# Patient Record
Sex: MALE | Race: WHITE | NOT HISPANIC OR LATINO | URBAN - METROPOLITAN AREA
[De-identification: names, ages, dates, MRNs, and addresses within clinical notes are randomized per-mention and may not be internally consistent; named-entity substitution may affect disease eponyms.]

---

## 2017-02-06 ENCOUNTER — ALLSCRIPTS OFFICE VISIT (OUTPATIENT)
Dept: OTHER | Facility: OTHER | Age: 32
End: 2017-02-06

## 2018-01-14 VITALS
SYSTOLIC BLOOD PRESSURE: 118 MMHG | BODY MASS INDEX: 21.13 KG/M2 | TEMPERATURE: 98.3 F | HEIGHT: 66 IN | HEART RATE: 96 BPM | WEIGHT: 131.5 LBS | DIASTOLIC BLOOD PRESSURE: 80 MMHG | OXYGEN SATURATION: 97 %

## 2018-09-13 ENCOUNTER — OFFICE VISIT (OUTPATIENT)
Dept: FAMILY MEDICINE CLINIC | Facility: CLINIC | Age: 33
End: 2018-09-13
Payer: COMMERCIAL

## 2018-09-13 VITALS
OXYGEN SATURATION: 97 % | WEIGHT: 131 LBS | BODY MASS INDEX: 21.05 KG/M2 | DIASTOLIC BLOOD PRESSURE: 82 MMHG | HEART RATE: 78 BPM | TEMPERATURE: 99 F | SYSTOLIC BLOOD PRESSURE: 130 MMHG | HEIGHT: 66 IN

## 2018-09-13 DIAGNOSIS — Z13.220 SCREENING FOR LIPID DISORDERS: ICD-10-CM

## 2018-09-13 DIAGNOSIS — Z00.00 HEALTHCARE MAINTENANCE: Primary | ICD-10-CM

## 2018-09-13 PROCEDURE — 99395 PREV VISIT EST AGE 18-39: CPT | Performed by: NURSE PRACTITIONER

## 2018-09-13 NOTE — PROGRESS NOTES
Assessment/Plan:    No problem-specific Assessment & Plan notes found for this encounter  Diagnoses and all orders for this visit:    Healthcare maintenance  Comments:  will update labs patient is appearing healthy and well     Screening for lipid disorders  -     Comprehensive metabolic panel; Future  -     Lipid panel; Future    Other orders  -     Zinc-Magnesium Aspart-Vit B6 (ZINC MAGNESIUM ASPARTATE PO); Take by mouth  -     Melatonin 2 5 MG CHEW; Chew 2 5 mg          Subjective:      Patient ID: Alfredo Doherty is a 29 y o  male  Patient here today for his check up has been a while since his last office appointment  Patient reports that he has been having some increase anxiety lately and increase urination in the day time no burning with with urination and no new sexual partners patient is working a 10 hour shift and off one day a week  PHQ-9 score 4 DEV-7 score 4  Patient having a few times with feeling a tightness in his chest on both sides of his body happening only on occasion about twice a week noticed when he is driving to or from work seems to be muscular in nature  Patient had a recent history of having physical therapy to help his lower back and improve his posture  Patient also has been doing pull ups again more and had not been doing this exercise in the past          The following portions of the patient's history were reviewed and updated as appropriate:   He  has a past medical history of Anxiety; Attention deficit disorder (ADD) without hyperactivity; Narcolepsy; and Sleep apnea  He   Patient Active Problem List    Diagnosis Date Noted    Screening for lipid disorders 09/13/2018     He  has no past surgical history on file  His family history includes Hypertension in his father  He  reports that he has never smoked  He has never used smokeless tobacco  His alcohol and drug histories are not on file  He has No Known Allergies       Review of Systems   Constitutional: Negative for activity change, appetite change, chills, diaphoresis, fatigue, fever and unexpected weight change  HENT: Negative for congestion, ear pain, hearing loss, postnasal drip, sinus pain, sinus pressure, sneezing and sore throat  Eyes: Negative for pain, redness and visual disturbance  Respiratory: Negative for cough and shortness of breath  Cardiovascular: Negative for chest pain and leg swelling  Gastrointestinal: Negative for abdominal pain, diarrhea, nausea and vomiting  Endocrine: Negative  Genitourinary: Negative  Musculoskeletal: Positive for myalgias  Negative for arthralgias  Skin: Negative  Allergic/Immunologic: Negative  Neurological: Negative for dizziness and light-headedness  Psychiatric/Behavioral: Negative for behavioral problems and dysphoric mood  Objective:      /82   Pulse 78   Temp 99 °F (37 2 °C)   Ht 5' 6" (1 676 m)   Wt 59 4 kg (131 lb)   SpO2 97%   BMI 21 14 kg/m²          Physical Exam   Constitutional: He is oriented to person, place, and time  Vital signs are normal  He appears well-developed and well-nourished  No distress  HENT:   Head: Normocephalic and atraumatic  Eyes: Pupils are equal, round, and reactive to light  Neck: Normal range of motion  No thyromegaly present  Cardiovascular: Normal rate, regular rhythm, normal heart sounds and intact distal pulses  No murmur heard  Pulmonary/Chest: Effort normal and breath sounds normal  No respiratory distress  He has no wheezes  Abdominal: Soft  Bowel sounds are normal    Musculoskeletal: Normal range of motion  Neurological: He is alert and oriented to person, place, and time  Skin: Skin is warm and dry  Psychiatric: He has a normal mood and affect  Nursing note and vitals reviewed

## 2018-09-21 ENCOUNTER — APPOINTMENT (OUTPATIENT)
Dept: LAB | Facility: CLINIC | Age: 33
End: 2018-09-21
Payer: COMMERCIAL

## 2018-09-21 DIAGNOSIS — Z13.220 SCREENING FOR LIPID DISORDERS: ICD-10-CM

## 2018-09-21 LAB
ALBUMIN SERPL BCP-MCNC: 4.3 G/DL (ref 3.5–5)
ALP SERPL-CCNC: 75 U/L (ref 46–116)
ALT SERPL W P-5'-P-CCNC: 28 U/L (ref 12–78)
ANION GAP SERPL CALCULATED.3IONS-SCNC: 5 MMOL/L (ref 4–13)
AST SERPL W P-5'-P-CCNC: 18 U/L (ref 5–45)
BILIRUB SERPL-MCNC: 0.54 MG/DL (ref 0.2–1)
BUN SERPL-MCNC: 16 MG/DL (ref 5–25)
CALCIUM SERPL-MCNC: 9.4 MG/DL (ref 8.3–10.1)
CHLORIDE SERPL-SCNC: 104 MMOL/L (ref 100–108)
CHOLEST SERPL-MCNC: 146 MG/DL (ref 50–200)
CO2 SERPL-SCNC: 29 MMOL/L (ref 21–32)
CREAT SERPL-MCNC: 1.05 MG/DL (ref 0.6–1.3)
GFR SERPL CREATININE-BSD FRML MDRD: 95 ML/MIN/1.73SQ M
GLUCOSE P FAST SERPL-MCNC: 87 MG/DL (ref 65–99)
HDLC SERPL-MCNC: 52 MG/DL (ref 40–60)
LDLC SERPL CALC-MCNC: 82 MG/DL (ref 0–100)
NONHDLC SERPL-MCNC: 94 MG/DL
POTASSIUM SERPL-SCNC: 4 MMOL/L (ref 3.5–5.3)
PROT SERPL-MCNC: 7.8 G/DL (ref 6.4–8.2)
SODIUM SERPL-SCNC: 138 MMOL/L (ref 136–145)
TRIGL SERPL-MCNC: 62 MG/DL

## 2018-09-21 PROCEDURE — 80053 COMPREHEN METABOLIC PANEL: CPT

## 2018-09-21 PROCEDURE — 36415 COLL VENOUS BLD VENIPUNCTURE: CPT

## 2018-09-21 PROCEDURE — 80061 LIPID PANEL: CPT

## 2019-11-23 ENCOUNTER — TELEPHONE (OUTPATIENT)
Dept: FAMILY MEDICINE CLINIC | Facility: CLINIC | Age: 34
End: 2019-11-23

## 2021-07-30 ENCOUNTER — OFFICE VISIT (OUTPATIENT)
Dept: FAMILY MEDICINE CLINIC | Facility: CLINIC | Age: 36
End: 2021-07-30
Payer: COMMERCIAL

## 2021-07-30 VITALS
HEART RATE: 94 BPM | OXYGEN SATURATION: 95 % | WEIGHT: 141 LBS | DIASTOLIC BLOOD PRESSURE: 80 MMHG | HEIGHT: 66 IN | BODY MASS INDEX: 22.66 KG/M2 | SYSTOLIC BLOOD PRESSURE: 128 MMHG | TEMPERATURE: 96.8 F

## 2021-07-30 DIAGNOSIS — L30.4 INTERTRIGO: Primary | ICD-10-CM

## 2021-07-30 PROCEDURE — 99214 OFFICE O/P EST MOD 30 MIN: CPT | Performed by: PHYSICIAN ASSISTANT

## 2021-07-30 PROCEDURE — 3008F BODY MASS INDEX DOCD: CPT | Performed by: PHYSICIAN ASSISTANT

## 2021-07-30 PROCEDURE — 3725F SCREEN DEPRESSION PERFORMED: CPT | Performed by: PHYSICIAN ASSISTANT

## 2021-07-30 RX ORDER — CLOTRIMAZOLE 1 %
CREAM (GRAM) TOPICAL 2 TIMES DAILY
Qty: 30 G | Refills: 0 | Status: SHIPPED | OUTPATIENT
Start: 2021-07-30

## 2021-07-30 NOTE — PROGRESS NOTES
Assessment/Plan:       Problem List Items Addressed This Visit     None      Visit Diagnoses     Intertrigo    -  Primary    Relevant Medications    clotrimazole (LOTRIMIN) 1 % cream        rash consistent with fungal etiology, recommend lotrimin BID, keep axillae cool, dry and clean  Follow up if not improving  Neck neck and HENT exam normal      Subjective:      Patient ID: Toro Uribe is a 37 y o  male  Pt presents with a rash in his armpits over the past 3 months  Comes and goes  Occasionally itchy  Red  No other rashes  No significant pain  He works in [employer] often and shares he has been bit by several insects  He also shares when he was riding his horse recently the horse puts its head back and stuck his nose and caused his neck to extend  He shares he has a mild dull discomfort to the anterior neck in the submandibular region  Improved with ibuprofen  No bruising  No jaw pain  Seems to be improving per pt     Rash  This is a new problem  The current episode started more than 1 month ago  The problem has been waxing and waning since onset  The affected locations include the left axilla and right axilla  The rash is characterized by redness  He was exposed to an insect bite/sting and a spider bite  Pertinent negatives include no anorexia, congestion, cough, diarrhea, eye pain, facial edema, fatigue, fever, joint pain, nail changes, rhinorrhea, shortness of breath, sore throat or vomiting  The following portions of the patient's history were reviewed and updated as appropriate:   He  has a past medical history of Anxiety, Attention deficit disorder (ADD) without hyperactivity, Narcolepsy, and Sleep apnea  He   Patient Active Problem List    Diagnosis Date Noted    Screening for lipid disorders 09/13/2018     He  has no past surgical history on file  His family history includes Hypertension in his father  He  reports that he has never smoked   He has never used smokeless tobacco  No history on file for alcohol use and drug use  Current Outpatient Medications   Medication Sig Dispense Refill    Zinc-Magnesium Aspart-Vit B6 (ZINC MAGNESIUM ASPARTATE PO) Take by mouth      clotrimazole (LOTRIMIN) 1 % cream Apply topically 2 (two) times a day 30 g 0    Melatonin 2 5 MG CHEW Chew 2 5 mg (Patient not taking: Reported on 7/30/2021)       No current facility-administered medications for this visit  Current Outpatient Medications on File Prior to Visit   Medication Sig    Zinc-Magnesium Aspart-Vit B6 (ZINC MAGNESIUM ASPARTATE PO) Take by mouth    Melatonin 2 5 MG CHEW Chew 2 5 mg (Patient not taking: Reported on 7/30/2021)     No current facility-administered medications on file prior to visit  He has No Known Allergies       Review of Systems   Constitutional: Negative for fatigue and fever  HENT: Negative for congestion, rhinorrhea and sore throat  Eyes: Negative for pain  Respiratory: Negative for cough and shortness of breath  Gastrointestinal: Negative for anorexia, diarrhea and vomiting  Musculoskeletal: Negative for joint pain  Skin: Positive for color change and rash  Negative for nail changes  Objective:      /80   Pulse 94   Temp (!) 96 8 °F (36 °C)   Ht 5' 6" (1 676 m)   Wt 64 kg (141 lb)   SpO2 95%   BMI 22 76 kg/m²          Physical Exam  Vitals and nursing note reviewed  Constitutional:       General: He is not in acute distress  Appearance: Normal appearance  HENT:      Head: Normocephalic and atraumatic  Right Ear: Tympanic membrane, ear canal and external ear normal       Left Ear: Tympanic membrane, ear canal and external ear normal       Nose: Nose normal       Mouth/Throat:      Mouth: Mucous membranes are moist       Pharynx: Oropharynx is clear  No oropharyngeal exudate or posterior oropharyngeal erythema  Eyes:      Pupils: Pupils are equal, round, and reactive to light     Neck:      Thyroid: No thyromegaly or thyroid tenderness  Trachea: Trachea and phonation normal  No tracheal tenderness or tracheal deviation  Cardiovascular:      Rate and Rhythm: Normal rate and regular rhythm  Pulses: Normal pulses  Heart sounds: Normal heart sounds  No murmur heard  Pulmonary:      Effort: Pulmonary effort is normal  No respiratory distress  Breath sounds: Normal breath sounds  No wheezing, rhonchi or rales  Musculoskeletal:         General: Normal range of motion  Cervical back: Normal range of motion and neck supple  No tenderness  No spinous process tenderness or muscular tenderness  Normal range of motion  Lymphadenopathy:      Cervical: No cervical adenopathy  Right cervical: No superficial, deep or posterior cervical adenopathy  Left cervical: No superficial, deep or posterior cervical adenopathy  Skin:     General: Skin is warm and dry  Findings: Erythema and rash present  Comments: Fungal rash of bilateral axilla   Neurological:      Mental Status: He is alert and oriented to person, place, and time     Psychiatric:         Mood and Affect: Mood and affect normal

## 2021-09-30 ENCOUNTER — OFFICE VISIT (OUTPATIENT)
Dept: FAMILY MEDICINE CLINIC | Facility: CLINIC | Age: 36
End: 2021-09-30
Payer: COMMERCIAL

## 2021-09-30 VITALS
TEMPERATURE: 98.7 F | HEART RATE: 80 BPM | BODY MASS INDEX: 23.3 KG/M2 | WEIGHT: 145 LBS | HEIGHT: 66 IN | DIASTOLIC BLOOD PRESSURE: 80 MMHG | OXYGEN SATURATION: 98 % | SYSTOLIC BLOOD PRESSURE: 130 MMHG

## 2021-09-30 DIAGNOSIS — L30.4 INTERTRIGO: ICD-10-CM

## 2021-09-30 DIAGNOSIS — Z13.220 SCREENING FOR LIPID DISORDERS: ICD-10-CM

## 2021-09-30 DIAGNOSIS — Z13.1 SCREENING FOR DIABETES MELLITUS: ICD-10-CM

## 2021-09-30 DIAGNOSIS — Z00.00 HEALTH MAINTENANCE EXAMINATION: Primary | ICD-10-CM

## 2021-09-30 PROCEDURE — 99395 PREV VISIT EST AGE 18-39: CPT | Performed by: PHYSICIAN ASSISTANT

## 2021-09-30 RX ORDER — CLOTRIMAZOLE AND BETAMETHASONE DIPROPIONATE 10; .64 MG/G; MG/G
CREAM TOPICAL 2 TIMES DAILY
Qty: 30 G | Refills: 0 | Status: SHIPPED | OUTPATIENT
Start: 2021-09-30

## 2021-09-30 NOTE — PROGRESS NOTES
Assessment/Plan:       Problem List Items Addressed This Visit        Other    Screening for lipid disorders    Relevant Orders    Lipid Panel with Direct LDL reflex      Other Visit Diagnoses     Health maintenance examination    -  Primary    Screening for diabetes mellitus        Relevant Orders    Comprehensive metabolic panel    Intertrigo        Relevant Medications    clotrimazole-betamethasone (LOTRISONE) 1-0 05 % cream        hx reviewed and updated  Appearance of rash in axilla improving but not resolving  Switch to lotrisone  Keep cool and dry  Update screening labs  Exam otherwise normal  Annual follow ups, earlier as needed    Subjective:      Patient ID: Baldomero Spann is a 37 y o  male  Pt presents for annual physical  He was seen 2 months ago for red rash in bilateral axilla and given lotrimin, the appearance improved but has not resolved  Otherwise he has no acute complaints  He denies interval health changes  No daily medications besides vitamin  No changes in FH  No smoker  No alcohol use  No drug use  Rash  This is a chronic problem  The current episode started more than 1 month ago  The problem is unchanged  The affected locations include the left axilla and right axilla  The rash is characterized by redness  He was exposed to nothing  Pertinent negatives include no anorexia, congestion, cough, diarrhea, eye pain, facial edema, fatigue, fever, joint pain, nail changes, rhinorrhea, shortness of breath, sore throat or vomiting  The following portions of the patient's history were reviewed and updated as appropriate:   He  has a past medical history of Anxiety, Attention deficit disorder (ADD) without hyperactivity, Narcolepsy, and Sleep apnea  He   Patient Active Problem List    Diagnosis Date Noted    Screening for lipid disorders 09/13/2018     He  has no past surgical history on file  His family history includes Hypertension in his father    He  reports that he has never smoked  He has never used smokeless tobacco  No history on file for alcohol use and drug use  Current Outpatient Medications   Medication Sig Dispense Refill    clotrimazole (LOTRIMIN) 1 % cream Apply topically 2 (two) times a day 30 g 0    clotrimazole-betamethasone (LOTRISONE) 1-0 05 % cream Apply topically 2 (two) times a day 30 g 0    Melatonin 2 5 MG CHEW Chew 2 5 mg (Patient not taking: Reported on 7/30/2021)      Zinc-Magnesium Aspart-Vit B6 (ZINC MAGNESIUM ASPARTATE PO) Take by mouth       No current facility-administered medications for this visit  Current Outpatient Medications on File Prior to Visit   Medication Sig    clotrimazole (LOTRIMIN) 1 % cream Apply topically 2 (two) times a day    Melatonin 2 5 MG CHEW Chew 2 5 mg (Patient not taking: Reported on 7/30/2021)    Zinc-Magnesium Aspart-Vit B6 (ZINC MAGNESIUM ASPARTATE PO) Take by mouth     No current facility-administered medications on file prior to visit  He has No Known Allergies       Review of Systems   Constitutional: Negative for fatigue and fever  HENT: Negative for congestion, rhinorrhea and sore throat  Eyes: Negative for pain  Respiratory: Negative for cough and shortness of breath  Gastrointestinal: Negative for anorexia, diarrhea and vomiting  Musculoskeletal: Negative for joint pain  Skin: Positive for rash  Negative for nail changes  Objective:      /80   Pulse 80   Temp 98 7 °F (37 1 °C)   Ht 5' 6" (1 676 m)   Wt 65 8 kg (145 lb)   SpO2 98%   BMI 23 40 kg/m²          Physical Exam  Vitals and nursing note reviewed  Constitutional:       General: He is not in acute distress  Appearance: Normal appearance  HENT:      Head: Normocephalic and atraumatic        Right Ear: Tympanic membrane, ear canal and external ear normal       Left Ear: Tympanic membrane, ear canal and external ear normal       Nose: Nose normal       Mouth/Throat:      Mouth: Mucous membranes are moist  Pharynx: Oropharynx is clear  No oropharyngeal exudate or posterior oropharyngeal erythema  Eyes:      Pupils: Pupils are equal, round, and reactive to light  Cardiovascular:      Rate and Rhythm: Normal rate and regular rhythm  Pulses: Normal pulses  Heart sounds: Normal heart sounds  No murmur heard  Pulmonary:      Effort: Pulmonary effort is normal  No respiratory distress  Breath sounds: Normal breath sounds  Abdominal:      General: Bowel sounds are normal  There is no distension  Palpations: Abdomen is soft  Tenderness: There is no abdominal tenderness  There is no guarding  Musculoskeletal:         General: Normal range of motion  Cervical back: Normal range of motion and neck supple  Right lower leg: No edema  Left lower leg: No edema  Lymphadenopathy:      Cervical: No cervical adenopathy  Skin:     General: Skin is warm and dry  Neurological:      Mental Status: He is alert and oriented to person, place, and time     Psychiatric:         Mood and Affect: Mood and affect normal

## 2021-10-01 ENCOUNTER — APPOINTMENT (OUTPATIENT)
Dept: LAB | Facility: CLINIC | Age: 36
End: 2021-10-01
Payer: COMMERCIAL

## 2021-10-01 DIAGNOSIS — Z13.1 SCREENING FOR DIABETES MELLITUS: ICD-10-CM

## 2021-10-01 DIAGNOSIS — Z13.220 SCREENING FOR LIPID DISORDERS: ICD-10-CM

## 2021-10-01 LAB
ALBUMIN SERPL BCP-MCNC: 4.1 G/DL (ref 3.5–5)
ALP SERPL-CCNC: 76 U/L (ref 46–116)
ALT SERPL W P-5'-P-CCNC: 67 U/L (ref 12–78)
ANION GAP SERPL CALCULATED.3IONS-SCNC: 3 MMOL/L (ref 4–13)
AST SERPL W P-5'-P-CCNC: 25 U/L (ref 5–45)
BILIRUB SERPL-MCNC: 0.45 MG/DL (ref 0.2–1)
BUN SERPL-MCNC: 16 MG/DL (ref 5–25)
CALCIUM SERPL-MCNC: 9.1 MG/DL (ref 8.3–10.1)
CHLORIDE SERPL-SCNC: 106 MMOL/L (ref 100–108)
CHOLEST SERPL-MCNC: 171 MG/DL (ref 50–200)
CO2 SERPL-SCNC: 30 MMOL/L (ref 21–32)
CREAT SERPL-MCNC: 0.93 MG/DL (ref 0.6–1.3)
GFR SERPL CREATININE-BSD FRML MDRD: 107 ML/MIN/1.73SQ M
GLUCOSE P FAST SERPL-MCNC: 90 MG/DL (ref 65–99)
HDLC SERPL-MCNC: 51 MG/DL
LDLC SERPL CALC-MCNC: 111 MG/DL (ref 0–100)
POTASSIUM SERPL-SCNC: 4 MMOL/L (ref 3.5–5.3)
PROT SERPL-MCNC: 7.6 G/DL (ref 6.4–8.2)
SODIUM SERPL-SCNC: 139 MMOL/L (ref 136–145)
TRIGL SERPL-MCNC: 43 MG/DL

## 2021-10-01 PROCEDURE — 80061 LIPID PANEL: CPT

## 2021-10-01 PROCEDURE — 36415 COLL VENOUS BLD VENIPUNCTURE: CPT

## 2021-10-01 PROCEDURE — 80053 COMPREHEN METABOLIC PANEL: CPT

## 2022-08-19 ENCOUNTER — OFFICE VISIT (OUTPATIENT)
Dept: FAMILY MEDICINE CLINIC | Facility: CLINIC | Age: 37
End: 2022-08-19
Payer: COMMERCIAL

## 2022-08-19 VITALS
HEART RATE: 68 BPM | OXYGEN SATURATION: 96 % | DIASTOLIC BLOOD PRESSURE: 64 MMHG | HEIGHT: 66 IN | BODY MASS INDEX: 22.34 KG/M2 | WEIGHT: 139 LBS | SYSTOLIC BLOOD PRESSURE: 120 MMHG

## 2022-08-19 DIAGNOSIS — H60.01 ABSCESS OF RIGHT EXTERNAL EAR: Primary | ICD-10-CM

## 2022-08-19 DIAGNOSIS — F60.1 SCHIZOID PERSONALITY (HCC): ICD-10-CM

## 2022-08-19 PROCEDURE — 99214 OFFICE O/P EST MOD 30 MIN: CPT | Performed by: PHYSICIAN ASSISTANT

## 2022-08-19 PROCEDURE — 3725F SCREEN DEPRESSION PERFORMED: CPT | Performed by: PHYSICIAN ASSISTANT

## 2022-08-19 RX ORDER — CEPHALEXIN 500 MG/1
500 CAPSULE ORAL EVERY 6 HOURS SCHEDULED
Qty: 20 CAPSULE | Refills: 0 | Status: SHIPPED | OUTPATIENT
Start: 2022-08-19 | End: 2022-08-24

## 2022-08-19 NOTE — PROGRESS NOTES
Assessment/Plan:       Problem List Items Addressed This Visit    None     Visit Diagnoses     Abscess of right external ear    -  Primary    Relevant Medications    cephalexin (KEFLEX) 500 mg capsule    Other Relevant Orders    Ambulatory Referral to General Surgery    Schizoid personality [facility])            will cover abscess with keflex, due to location and longevity refer to general surgery for intervention  Discussed return precautions  Referral to neuropsychology provided to pt  Subjective:      Patient ID: Lucian Barajas is a 31 y o  male  Pt presents with lump behind R ear, seems to get bigger/smaller  Drains occasionally  Painful  No fevers/chills  Present for 2 months    Also shares when he was 23 he was diagnosed with schizoid personality  He shares he feels his sx may be more related to autism, would like a repeat neuropsych eval now that 14 years has passed  He denies hallucinations, delusions  Mood stable  The following portions of the patient's history were reviewed and updated as appropriate:   He  has a past medical history of Anxiety, Attention deficit disorder (ADD) without hyperactivity, Narcolepsy, and Sleep apnea  He   Patient Active Problem List    Diagnosis Date Noted    Screening for lipid disorders 09/13/2018     He  has no past surgical history on file  His family history includes Hypertension in his father  He  reports that he has never smoked  He has never used smokeless tobacco  No history on file for alcohol use and drug use  Current Outpatient Medications   Medication Sig Dispense Refill    cephalexin (KEFLEX) 500 mg capsule Take 1 capsule (500 mg total) by mouth every 6 (six) hours for 5 days 20 capsule 0    clotrimazole-betamethasone (LOTRISONE) 1-0 05 % cream Apply topically 2 (two) times a day 30 g 0    Zinc-Magnesium Aspart-Vit B6 (ZINC MAGNESIUM ASPARTATE PO) Take by mouth       No current facility-administered medications for this visit       Current Outpatient Medications on File Prior to Visit   Medication Sig    clotrimazole-betamethasone (LOTRISONE) 1-0 05 % cream Apply topically 2 (two) times a day    Zinc-Magnesium Aspart-Vit B6 (ZINC MAGNESIUM ASPARTATE PO) Take by mouth    [DISCONTINUED] clotrimazole (LOTRIMIN) 1 % cream Apply topically 2 (two) times a day    [DISCONTINUED] Melatonin 2 5 MG CHEW Chew 2 5 mg     No current facility-administered medications on file prior to visit  He has No Known Allergies       Review of Systems   Constitutional: Negative for chills, fatigue and fever  HENT: Negative for congestion, ear pain, hearing loss, nosebleeds, postnasal drip, rhinorrhea, sinus pressure, sinus pain, sneezing and sore throat  Eyes: Negative for pain, discharge, itching and visual disturbance  Respiratory: Negative for cough, chest tightness, shortness of breath and wheezing  Cardiovascular: Negative for chest pain, palpitations and leg swelling  Gastrointestinal: Negative for abdominal pain, blood in stool, constipation, diarrhea, nausea and vomiting  Genitourinary: Negative for frequency and urgency  Skin:        abscess   Neurological: Negative for dizziness, light-headedness and numbness  Objective:      /64   Pulse 68   Ht 5' 6" (1 676 m)   Wt 63 kg (139 lb)   SpO2 96%   BMI 22 44 kg/m²          Physical Exam  Vitals and nursing note reviewed  Constitutional:       General: He is not in acute distress  Appearance: Normal appearance  He is not ill-appearing  HENT:      Head: Normocephalic and atraumatic  Right Ear: Tympanic membrane and ear canal normal       Left Ear: Tympanic membrane and ear canal normal       Nose: Nose normal    Pulmonary:      Effort: Pulmonary effort is normal  No respiratory distress  Musculoskeletal:      Cervical back: Normal range of motion and neck supple  No tenderness  Lymphadenopathy:      Cervical: No cervical adenopathy     Skin:     General: Skin is warm and dry  Findings: Abscess present  Neurological:      Mental Status: He is alert and oriented to person, place, and time

## 2022-08-26 DIAGNOSIS — Z13.41 ENCOUNTER FOR SCREENING FOR AUTISM: Primary | ICD-10-CM

## 2022-08-31 ENCOUNTER — TELEPHONE (OUTPATIENT)
Dept: SURGERY | Facility: CLINIC | Age: 37
End: 2022-08-31

## 2022-08-31 NOTE — TELEPHONE ENCOUNTER
REP: Paula Tyson  REF# [id number]  PPO= NO REFERRAL  INS:[facility]  "since you par with your local [facility], this is in network"  Active as of: 7/1/2022

## 2022-09-02 ENCOUNTER — CONSULT (OUTPATIENT)
Dept: SURGERY | Facility: CLINIC | Age: 37
End: 2022-09-02
Payer: COMMERCIAL

## 2022-09-02 VITALS
BODY MASS INDEX: 22.18 KG/M2 | SYSTOLIC BLOOD PRESSURE: 118 MMHG | OXYGEN SATURATION: 98 % | HEIGHT: 66 IN | TEMPERATURE: 98 F | HEART RATE: 79 BPM | WEIGHT: 138 LBS | RESPIRATION RATE: 16 BRPM | DIASTOLIC BLOOD PRESSURE: 84 MMHG

## 2022-09-02 DIAGNOSIS — H60.01 ABSCESS OF RIGHT EXTERNAL EAR: ICD-10-CM

## 2022-09-02 PROCEDURE — 99243 OFF/OP CNSLTJ NEW/EST LOW 30: CPT | Performed by: STUDENT IN AN ORGANIZED HEALTH CARE EDUCATION/TRAINING PROGRAM

## 2022-09-02 NOTE — PROGRESS NOTES
Assessment/Plan:  79-year-old male with right posterior ear abscess, possible sebaceous cyst  - patient presents due to an abscess behind his right ear  -denies any bump or mass there prior to the infection  -he states he noticed the infection as he had a large painful lump behind his ear but this got better with antibiotics  -there is a small amount of clear/bloody drainage but denies any sebum like material  -on exam there is a 1 x 0 5 cm raised area behind his right ear without signs of infection, possible sebaceous cyst  -primary care physician note from 08/19/2022 reviewed  -CMP from 10/01/2021 reviewed  -discussed with patient that since there is a small bump that is still present it is possible that there is a sebaceous cyst, patient denies any drainage of sebum  -discussed possible surgical intervention with patient, if there is a cyst present it may get larger or become infected again, at this time no signs of infection  -patient states he would like to continue to monitor the area and that if it gets larger or gets infected again he may consider surgical excision  -followup in office as needed     1  Abscess of right external ear  -     Ambulatory Referral to General Surgery               Subjective:      Patient ID: Lawrence Nichols is a 31 y o  male  Triage Notes:    Patient is a 79-year-old male who presents to office for evaluation due to a mass behind his right ear  Patient states that he had a bump behind his right ear and he 1st noticed it when it was red and inflamed  He denies having any mass or bump there prior  He was seen by his primary care physician and given antibiotics  Due to this the swelling and redness decreased  He denies any current pain  He states he did have a small amount of clear/bloody drainage but nothing else        The following portions of the patient's history were reviewed and updated as appropriate:   He  has a past medical history of Anxiety, Attention deficit disorder (ADD) without hyperactivity, Narcolepsy, and Sleep apnea  He   Patient Active Problem List    Diagnosis Date Noted    Abscess of right external ear 09/02/2022    Screening for lipid disorders 09/13/2018     He  has no past surgical history on file  His family history includes Hypertension in his father  He  reports that he has never smoked  He has never used smokeless tobacco  He reports that he does not drink alcohol and does not use drugs  Current Outpatient Medications on File Prior to Visit   Medication Sig    clotrimazole-betamethasone (LOTRISONE) 1-0 05 % cream Apply topically 2 (two) times a day    Zinc-Magnesium Aspart-Vit B6 (ZINC MAGNESIUM ASPARTATE PO) Take by mouth     No current facility-administered medications on file prior to visit  He has No Known Allergies       Review of Systems   Constitutional: Negative for chills, fatigue and fever  HENT: Negative for congestion, hearing loss, rhinorrhea and sore throat  Eyes: Negative for pain and discharge  Respiratory: Negative for cough, chest tightness and shortness of breath  Cardiovascular: Negative for chest pain and palpitations  Gastrointestinal: Negative for abdominal pain, constipation, diarrhea, nausea and vomiting  Endocrine: Negative for cold intolerance and heat intolerance  Genitourinary: Negative for difficulty urinating and dysuria  Musculoskeletal: Negative for back pain and neck pain  Skin: Negative for color change and rash  Positive subcutaneous mass behind ear   Allergic/Immunologic: Negative for environmental allergies and food allergies  Neurological: Negative for seizures and headaches  Hematological: Negative for adenopathy  Does not bruise/bleed easily  Psychiatric/Behavioral: Negative for confusion and hallucinations           Objective:      /84   Pulse 79   Temp 98 °F (36 7 °C) (Temporal)   Resp 16   Ht 5' 6" (1 676 m)   Wt 62 6 kg (138 lb)   SpO2 98%   BMI 22 27 kg/m²     Below is the patient's most recent value for Albumin, ALT, AST, BUN, Calcium, Chloride, Cholesterol, CO2, Creatinine, GFR, Glucose, HDL, Hematocrit, Hemoglobin, Hemoglobin A1C, LDL, Magnesium, Phosphorus, Platelets, Potassium, PSA, Sodium, Triglycerides, and WBC  Lab Results   Component Value Date    ALT 67 10/01/2021    AST 25 10/01/2021    BUN 16 10/01/2021    CALCIUM 9 1 10/01/2021     10/01/2021    CO2 30 10/01/2021    CREATININE 0 93 10/01/2021    HDL 51 10/01/2021    K 4 0 10/01/2021    TRIG 43 10/01/2021     Note: for a comprehensive list of the patient's lab results, access the Results Review activity  Physical Exam  Constitutional:       Appearance: Normal appearance  HENT:      Head: Normocephalic and atraumatic  Nose: Nose normal    Eyes:      General: No scleral icterus  Conjunctiva/sclera: Conjunctivae normal    Cardiovascular:      Rate and Rhythm: Normal rate and regular rhythm  Heart sounds: Normal heart sounds  Pulmonary:      Effort: Pulmonary effort is normal       Breath sounds: Normal breath sounds  Abdominal:      General: There is no distension  Palpations: Abdomen is soft  Tenderness: There is no abdominal tenderness  Musculoskeletal:         General: No signs of injury  Skin:     General: Skin is warm  Coloration: Skin is not jaundiced  Comments: 1 x 0 5 cm raised area behind his right ear without signs of infection, possible sebaceous cyst   Neurological:      General: No focal deficit present  Mental Status: He is alert and oriented to person, place, and time     Psychiatric:         Mood and Affect: Mood normal          Behavior: Behavior normal

## 2022-09-08 ENCOUNTER — TELEPHONE (OUTPATIENT)
Dept: PSYCHIATRY | Facility: CLINIC | Age: 37
End: 2022-09-08

## 2022-09-08 NOTE — TELEPHONE ENCOUNTER
contacted patient i regards to neuro psych referral for autism eval  in attempts to add patient to proper waitlist  spoke w, patient whom stated they are interested no preference in provider gender  [facility] preferred

## 2022-10-06 PROBLEM — H60.01 ABSCESS OF RIGHT EXTERNAL EAR: Status: RESOLVED | Noted: 2022-09-02 | Resolved: 2022-10-06

## 2022-10-06 PROBLEM — Z13.220 SCREENING FOR LIPID DISORDERS: Status: RESOLVED | Noted: 2018-09-13 | Resolved: 2022-10-06

## 2022-10-07 ENCOUNTER — OFFICE VISIT (OUTPATIENT)
Dept: FAMILY MEDICINE CLINIC | Facility: CLINIC | Age: 37
End: 2022-10-07
Payer: COMMERCIAL

## 2022-10-07 VITALS
HEART RATE: 72 BPM | BODY MASS INDEX: 22.34 KG/M2 | HEIGHT: 66 IN | WEIGHT: 139 LBS | OXYGEN SATURATION: 99 % | DIASTOLIC BLOOD PRESSURE: 88 MMHG | TEMPERATURE: 97 F | SYSTOLIC BLOOD PRESSURE: 116 MMHG

## 2022-10-07 DIAGNOSIS — Z13.220 SCREENING, LIPID: ICD-10-CM

## 2022-10-07 DIAGNOSIS — Z13.1 SCREENING FOR DIABETES MELLITUS: ICD-10-CM

## 2022-10-07 DIAGNOSIS — Z00.00 HEALTHCARE MAINTENANCE: Primary | ICD-10-CM

## 2022-10-07 DIAGNOSIS — Z23 NEED FOR TDAP VACCINATION: ICD-10-CM

## 2022-10-07 PROCEDURE — 90715 TDAP VACCINE 7 YRS/> IM: CPT

## 2022-10-07 PROCEDURE — 90471 IMMUNIZATION ADMIN: CPT

## 2022-10-07 PROCEDURE — 99395 PREV VISIT EST AGE 18-39: CPT | Performed by: FAMILY MEDICINE

## 2022-10-07 NOTE — PROGRESS NOTES
Shavon García 1985 male MRN: [id number]      ASSESSMENT/PLAN  Problem List Items Addressed This Visit    None     Visit Diagnoses     Healthcare maintenance    -  Primary    Screening for diabetes mellitus        Relevant Orders    Comprehensive metabolic panel    Screening, lipid        Relevant Orders    Lipid panel    Need for Tdap vaccination        Relevant Orders    TDAP VACCINE GREATER THAN OR EQUAL TO 8YO IM (Completed)        BP WNL   CMP + Lipids to screen for HLD, DM   HIV, Hep C screening deferred per pt request   Vaccinations: TDap given, Flu deferred, COVID UTD available for booster   Encouraged regular physical activity, varied diet, and regular dental/eye exams     No future appointments  SUBJECTIVE  CC: Annual Exam      HPI:  Shavon García is a 31 y o  male who presents for annual physical  History reviewed and updated as below  No acute concerns  Review of Systems   Constitutional: Negative for unexpected weight change  HENT: Negative for congestion, ear pain, rhinorrhea and sore throat  Eyes: Negative for visual disturbance  Respiratory: Negative for cough and shortness of breath  Cardiovascular: Negative for chest pain, palpitations and leg swelling  Gastrointestinal: Negative for abdominal pain, constipation and diarrhea  Endocrine: Negative for polyuria  Genitourinary: Negative for dysuria  Musculoskeletal: Positive for myalgias (pain in his arches -- doing some HEP from previous injury)  Neurological: Negative for dizziness and headaches  Psychiatric/Behavioral: Negative for sleep disturbance         Historical Information   The patient history was reviewed and updated as follows:    Past Medical History:   Diagnosis Date    Abscess of right external ear 9/2/2022    Anxiety     Attention deficit disorder (ADD) without hyperactivity     Catatonic schizophrenia, chronic condition ([facility] [address] ) 9/8/2009    Major depressive disorder 12/10/2009 Formatting of this note might be different from the original  ICD-10 update of inactive term    Narcolepsy     Other personality disorder 12/10/2009    Formatting of this note might be different from the original  Cluster A personality disorder features (schizoid versus schizotypal personality) per neuropsychological testing 12/4/2009   Persistent hypersomnia of non-organic origin 9/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    Sleep apnea      History reviewed  No pertinent surgical history  Family History   Problem Relation Age of Onset    Hypertension Father       Social History   Social History     Substance and Sexual Activity   Alcohol Use Never     Social History     Substance and Sexual Activity   Drug Use Never     Social History     Tobacco Use   Smoking Status Never Smoker   Smokeless Tobacco Never Used       Medications:     Current Outpatient Medications:     clotrimazole-betamethasone (LOTRISONE) 1-0 05 % cream, Apply topically 2 (two) times a day, Disp: 30 g, Rfl: 0    Zinc-Magnesium Aspart-Vit B6 (ZINC MAGNESIUM ASPARTATE PO), Take by mouth, Disp: , Rfl:   No Known Allergies    OBJECTIVE    Vitals:   Vitals:    10/07/22 0900   BP: 116/88   BP Location: Left arm   Patient Position: Sitting   Cuff Size: Adult   Pulse: 72   Temp: (!) 97 °F (36 1 °C)   SpO2: 99%   Weight: 63 kg (139 lb)   Height: 5' 6" (1 676 m)           Physical Exam  Vitals and nursing note reviewed  Constitutional:       General: He is not in acute distress  Appearance: Normal appearance  HENT:      Head: Normocephalic and atraumatic  Right Ear: Tympanic membrane, ear canal and external ear normal       Left Ear: Tympanic membrane, ear canal and external ear normal       Nose: Nose normal       Mouth/Throat:      Mouth: Mucous membranes are moist       Pharynx: No oropharyngeal exudate or posterior oropharyngeal erythema  Eyes:      Conjunctiva/sclera: Conjunctivae normal    Cardiovascular:      Rate and Rhythm: Normal rate and regular rhythm  Pulmonary:      Effort: Pulmonary effort is normal  No respiratory distress  Breath sounds: Normal breath sounds  Abdominal:      General: Bowel sounds are normal  There is no distension  Palpations: Abdomen is soft  Tenderness: There is no abdominal tenderness  Musculoskeletal:      Right lower leg: No edema  Left lower leg: No edema  Lymphadenopathy:      Cervical: No cervical adenopathy  Skin:     General: Skin is warm and dry  Neurological:      General: No focal deficit present  Mental Status: He is alert     Psychiatric:         Mood and Affect: Mood normal                     [address] [facility]   10/7/2022  9:33 AM

## 2022-10-21 ENCOUNTER — OFFICE VISIT (OUTPATIENT)
Dept: URGENT CARE | Facility: CLINIC | Age: 37
End: 2022-10-21
Payer: COMMERCIAL

## 2022-10-21 ENCOUNTER — LAB (OUTPATIENT)
Dept: LAB | Facility: CLINIC | Age: 37
End: 2022-10-21
Payer: COMMERCIAL

## 2022-10-21 VITALS
BODY MASS INDEX: 21.53 KG/M2 | WEIGHT: 134 LBS | TEMPERATURE: 97.5 F | OXYGEN SATURATION: 99 % | RESPIRATION RATE: 18 BRPM | SYSTOLIC BLOOD PRESSURE: 140 MMHG | HEART RATE: 68 BPM | HEIGHT: 66 IN | DIASTOLIC BLOOD PRESSURE: 76 MMHG

## 2022-10-21 DIAGNOSIS — E86.0 DEHYDRATION: ICD-10-CM

## 2022-10-21 DIAGNOSIS — R55 SYNCOPE, UNSPECIFIED SYNCOPE TYPE: Primary | ICD-10-CM

## 2022-10-21 DIAGNOSIS — Z13.220 SCREENING, LIPID: ICD-10-CM

## 2022-10-21 DIAGNOSIS — Z13.1 SCREENING FOR DIABETES MELLITUS: ICD-10-CM

## 2022-10-21 DIAGNOSIS — R11.2 NAUSEA AND VOMITING, UNSPECIFIED VOMITING TYPE: ICD-10-CM

## 2022-10-21 LAB
ALBUMIN SERPL BCP-MCNC: 4.2 G/DL (ref 3.5–5)
ALP SERPL-CCNC: 75 U/L (ref 46–116)
ALT SERPL W P-5'-P-CCNC: 53 U/L (ref 12–78)
ANION GAP SERPL CALCULATED.3IONS-SCNC: 4 MMOL/L (ref 4–13)
AST SERPL W P-5'-P-CCNC: 22 U/L (ref 5–45)
ATRIAL RATE: 62 BPM
BILIRUB SERPL-MCNC: 0.56 MG/DL (ref 0.2–1)
BUN SERPL-MCNC: 16 MG/DL (ref 5–25)
CALCIUM SERPL-MCNC: 9.4 MG/DL (ref 8.3–10.1)
CHLORIDE SERPL-SCNC: 103 MMOL/L (ref 96–108)
CHOLEST SERPL-MCNC: 151 MG/DL
CO2 SERPL-SCNC: 30 MMOL/L (ref 21–32)
CREAT SERPL-MCNC: 0.98 MG/DL (ref 0.6–1.3)
GFR SERPL CREATININE-BSD FRML MDRD: 100 ML/MIN/1.73SQ M
GLUCOSE P FAST SERPL-MCNC: 83 MG/DL (ref 65–99)
HDLC SERPL-MCNC: 51 MG/DL
LDLC SERPL CALC-MCNC: 88 MG/DL (ref 0–100)
NONHDLC SERPL-MCNC: 100 MG/DL
P AXIS: 66 DEGREES
POTASSIUM SERPL-SCNC: 4 MMOL/L (ref 3.5–5.3)
PR INTERVAL: 190 MS
PROT SERPL-MCNC: 7.8 G/DL (ref 6.4–8.4)
QRS AXIS: 86 DEGREES
QRSD INTERVAL: 98 MS
QT INTERVAL: 442 MS
QTC INTERVAL: 448 MS
SODIUM SERPL-SCNC: 137 MMOL/L (ref 135–147)
T WAVE AXIS: 73 DEGREES
TRIGL SERPL-MCNC: 60 MG/DL
VENTRICULAR RATE: 62 BPM

## 2022-10-21 PROCEDURE — 36415 COLL VENOUS BLD VENIPUNCTURE: CPT

## 2022-10-21 PROCEDURE — 80061 LIPID PANEL: CPT

## 2022-10-21 PROCEDURE — 80053 COMPREHEN METABOLIC PANEL: CPT

## 2022-10-21 PROCEDURE — 93010 ELECTROCARDIOGRAM REPORT: CPT | Performed by: INTERNAL MEDICINE

## 2022-10-21 PROCEDURE — 93005 ELECTROCARDIOGRAM TRACING: CPT

## 2022-10-21 PROCEDURE — 99214 OFFICE O/P EST MOD 30 MIN: CPT

## 2022-10-21 RX ORDER — ONDANSETRON 4 MG/1
4 TABLET, ORALLY DISINTEGRATING ORAL ONCE
Status: COMPLETED | OUTPATIENT
Start: 2022-10-21 | End: 2022-10-21

## 2022-10-21 RX ADMIN — ONDANSETRON 4 MG: 4 TABLET, ORALLY DISINTEGRATING ORAL at 08:30

## 2022-10-21 NOTE — PROGRESS NOTES
[facility]        NAME: Luis Armando Anderson is a 31 y o  male  : 1985    MRN: [id number]  DATE: 2022  TIME: 9:02 AM    Assessment and Plan   Syncope, unspecified syncope type [R55]  1  Syncope, unspecified syncope type     2  Nausea and vomiting, unspecified vomiting type  ondansetron (ZOFRAN-ODT) dispersible tablet 4 mg   3  Dehydration       EKGs were completed from EMS reviewed elvation in V3 and II of 1 and 1 5 mm respectively on EKG from EMS at 0833  Reading from "early repolarization " Other EKGs showing sinus arrhythmia verus normal sinus rhythm with rate 50-60s  Educated pt on, he did not want to go to the ER for further workup  Signed refusal form form EMS  Given zofran after vomiting episode - drank a full bottle of water without throwing up  Before leaving care - pt able to drink water and keep it down  Very slowly sat pt up at the side of the bed, had dangle feet, then short trial of standing/walking in room  He was able to walk out of the building with father without assistance  He would not be driving  Father will be staying with him today  Educated to drink lots of fluids and drink as soon as going home  Follow up with primary care in 3-5 days  Go to ER if symptoms get worse  Patient Instructions     Drink lots of fluids and eat as soon as going home  Follow up with PCP in 3-5 days  Proceed to ER if symptoms worsen - dizziness again, chest pain, shortness of breath  Chief Complaint     Chief Complaint   Patient presents with   • Loss of Consciousness     Pt had bloodwork done at the lab this morning, passed out and has been vomiting          History of Present Illness       Presents to urgent care after getting blood work today and passing out  He has a history of passing out with blood work procedures  Per report from staff, he passed out right after getting the blood work done   Then, for over an hour had been unable to stand without becoming dizzy/weak  Family medicine staff at bedside noted his heart rate increased over 20 bpm upon standing each time  He was very pale  When got to urgent care tried orange juice and then threw up  His blood sugar was 64 per report from in the lab before arrival  He had been fasting for bloodwork since 10 pm last night (now 0830 am)  At first glance, color was pale and unable to walk into the room  EMS was at bedside who did get an EKG on the patient he signed a refusal form at bedside and elected not to be transferred to the ER  He reports he did have a mild headache yesterday, denies other sick symptoms or known sick contacts  Review of Systems   Review of Systems   Constitutional: Negative for chills and fever  HENT: Negative for ear pain and sore throat  Respiratory: Negative for cough and shortness of breath  Cardiovascular: Negative for chest pain and palpitations  Gastrointestinal: Positive for nausea and vomiting  Negative for abdominal pain, constipation and diarrhea  Genitourinary: Negative for dysuria  Musculoskeletal: Negative for myalgias  Skin: Positive for pallor  Negative for color change and rash  Neurological: Positive for syncope, light-headedness (yesterday, resolved) and headaches  Negative for dizziness  Psychiatric/Behavioral: Negative for confusion  All other systems reviewed and are negative  Current Medications       Current Outpatient Medications:   •  clotrimazole-betamethasone (LOTRISONE) 1-0 05 % cream, Apply topically 2 (two) times a day (Patient not taking: Reported on 10/21/2022), Disp: 30 g, Rfl: 0  •  Zinc-Magnesium Aspart-Vit B6 (ZINC MAGNESIUM ASPARTATE PO), Take by mouth (Patient not taking: Reported on 10/21/2022), Disp: , Rfl:   No current facility-administered medications for this visit      Current Allergies     Allergies as of 10/21/2022   • (No Known Allergies)            The following portions of the patient's history were reviewed and updated as appropriate: allergies, current medications, past family history, past medical history, past social history, past surgical history and problem list      Past Medical History:   Diagnosis Date   • Abscess of right external ear 9/2/2022   • Anxiety    • Attention deficit disorder (ADD) without hyperactivity    • Catatonic schizophrenia, chronic condition ([facility] [address] ) 9/8/2009   • Major depressive disorder 12/10/2009    Formatting of this note might be different from the original  ICD-10 update of inactive term   • Narcolepsy    • Other personality disorder 12/10/2009    Formatting of this note might be different from the original  Cluster A personality disorder features (schizoid versus schizotypal personality) per neuropsychological testing 12/4/2009  • Persistent hypersomnia of non-organic origin 9/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   • Sleep apnea        History reviewed  No pertinent surgical history  Family History   Problem Relation Age of Onset   • Hypertension Father          Medications have been verified  Objective   /76   Pulse 68   Temp 97 5 °F (36 4 °C) (Temporal)   Resp 18   Ht 5' 6" (1 676 m)   Wt 60 8 kg (134 lb)   SpO2 99%   BMI 21 63 kg/m²        Physical Exam     Physical Exam  Vitals reviewed  Constitutional:       Appearance: Normal appearance  HENT:      Mouth/Throat:      Mouth: Mucous membranes are dry  Eyes:      Conjunctiva/sclera: Conjunctivae normal    Cardiovascular:      Rate and Rhythm: Normal rate and regular rhythm  Pulses: Normal pulses  Heart sounds: Normal heart sounds  No murmur heard  Pulmonary:      Effort: Pulmonary effort is normal  No respiratory distress  Breath sounds: Normal breath sounds  Musculoskeletal:         General: Normal range of motion  Skin:     General: Skin is warm and dry  Capillary Refill: Capillary refill takes less than 2 seconds  Neurological:      General: No focal deficit present        Mental Status: He is alert and oriented to person, place, and time     Psychiatric:         Mood and Affect: Mood normal          Behavior: Behavior normal

## 2023-01-20 ENCOUNTER — TELEPHONE (OUTPATIENT)
Dept: PSYCHIATRY | Facility: CLINIC | Age: 38
End: 2023-01-20

## 2023-01-20 NOTE — TELEPHONE ENCOUNTER
Behavioral Health Outpatient Intake Questions    Referred By  : PCP    Please advise interviewee that they need to answer all questions truthfully to allow for best care, and any misrepresentations of information may affect their ability to be seen at this clinic   => Was this discussed? Yes     If Minor Child (under age 25)    Who is/are the legal guardian(s) of the child? Is there a custody agreement? No     • If "YES"- Custody orders must be obtained prior to scheduling the first appointment  • In addition, Consent to Treatment must be signed by all legal guardians prior to scheduling the first appointment    • If "NO"- Consent to Treatment must be signed by all legal guardians prior to scheduling the first appointment    [address] History -     Presenting Problem (in patient's own words): suspicion of autsim     Are there any communication barriers for this patient? No                                               If yes, please describe barriers:   • If there is a unique situation, please refer to [address] [place] [address] for final determination  Are you taking any psychiatric medications? No   •   If "YES" -What are they    •   If "YES" -Who prescribes? Has the Patient previously received outpatient Talk Therapy or Medication Management from [facility]     •    If "YES"- When, Where and with Whom? •    If "NO" -Has Patient received these services elsewhere? •   If "YES" -When, Where, and with Whom? Has the Patient abused alcohol or other substances in the last 6 months ? No  No concerns of substance abuse are reported  •  If "YES" -What substance, How much, How often? •  If illegal substance: Refer to [facility] (for LASHONDA) or [employer]  •  If Alcohol in excess of 10 drinks per week:  Refer to [facility] (for LASHONDA) or [address] History-     Is this treatment court ordered?  No   • If "Yes"- refer to [address] for final determination  Has the Patient been convicted of a felony? No  •  If "Yes" -When, What? • Talk Therapy : Send to [facility] for final determination   • Med Management: Send to Dr Jean Carlos Mtz for final determination     ACCEPTED as a patient Yes  • If "Yes" Appointment Date: Dr Blythe Goodpasture 2/8@ 9a    Referred Elsewhere? No  • If “Yes” - (Where? Ex: [facility], AMARILIS/MAT, [address] [place] [address], etc )       Name of Insurance Co: [address] [place] [address]#[id number]  Insurance Phone # [phone] [facility]/ to determine prior authorization)   If ins is primary or secondary? If patient is a minor, parents information such as Name, D  O B of guarantor

## 2023-02-08 ENCOUNTER — DOCUMENTATION (OUTPATIENT)
Dept: BEHAVIORAL/MENTAL HEALTH CLINIC | Facility: CLINIC | Age: 38
End: 2023-02-08

## 2023-02-08 ENCOUNTER — OFFICE VISIT (OUTPATIENT)
Dept: PSYCHIATRY | Facility: CLINIC | Age: 38
End: 2023-02-08

## 2023-02-08 DIAGNOSIS — Z13.41 ENCOUNTER FOR SCREENING FOR AUTISM: ICD-10-CM

## 2023-02-08 DIAGNOSIS — F33.42 MDD (MAJOR DEPRESSIVE DISORDER), RECURRENT, IN FULL REMISSION (HCC): ICD-10-CM

## 2023-02-08 DIAGNOSIS — F40.10 SOCIAL ANXIETY DISORDER: Primary | ICD-10-CM

## 2023-02-08 NOTE — BH TREATMENT PLAN
TREATMENT PLAN (Medication Management Only)        [place] [facility]    Name and Date of Birth: Nicole Paredes 30 y o  1985  Date of Treatment Plan: February 8, 2023  Diagnosis/Diagnoses:    1  Social anxiety disorder    2  Encounter for screening for autism    3  MDD (major depressive disorder), recurrent, in full remission [facility])      Strengths/Personal Resources for Self-Care: ability to adapt to life changes, ability to communicate needs, ability to communicate well, ability to listen, ability to reason, ability to understand psychiatric illness, average or above intelligence, family ties, financial means, financial security, general fund of knowledge, good physical health, good understanding of illness, independence, motivation for treatment, ability to negotiate basic needs, Faith affiliation, being resoureceful, self-reliance, sense of humor, special hobby/interest, stable employment, strong sharda, well educated, willingness to work on problems, work skills  Area/Areas of need (in own words): anxiety, sleep problems, attention and concentration problems, poor interpersonal skills  1  Long Term Goal: improve interaction with people in the community  Target Date:6 months - 8/8/2023  Person/Persons responsible for completion of goal: Snow Quach  2  Short Term Objective (s) - How will we reach this goal?:   A  Provider new recommended medication/dosage changes and/or continue medication(s): no medicaitons prescribed   B  Keep all scheduled appointments  C  Spend more time with friends and family    Target Date:6 months - 8/8/2023  Person/Persons Responsible for Completion of Goal: Snow Quach  Progress Towards Goals: starting treatment  Treatment Modality: referral for individual psychotherapy, referral for neuropsychological assessment  Review due 180 days from date of this plan: 6 months - 8/8/2023  Expected length of service: maintenance  My Physician and I have developed this plan together and I agree to work on the goals and objectives  I understand the treatment goals that were developed for my treatment

## 2023-02-08 NOTE — PSYCH
[address] Muniz    Name and Date of Birth: Júnior Madden 30 y o  1985 MRN: [id number]    Date of Visit: February 8, 2023    Reason for visit: Initial psychiatric intake assessment    Chief complaint: On looking for autism evaluation  History of Present Illness (HPI):      Júnior Madden is a 30 y o , male, possessing a previous psychiatric history significant for anxiety and depression, medically complicated by hypersomnia, presenting for intake assessment  Matthew Barlow states he is unsure whether he has autism or not he would like to get formal assessment  Patient suffers from anxiety and depression in childhood but was complicated by attacks of catatonia during which he would freeze unwilling to be able to move or speak a few minutes on that acute stress  In the same time he used to sleep a lot and had difficulty with attention and concentration  He did multiple sleep studies was variable results, showing narcolepsy versus idiopathic hypersomnia  He also went for cognitive assessment at [facility] and showed difficulty with attention and concentration and processing memory functions and showed normal IQ  During that assessment he performed with MMPI and showed schizoid traits  Patient reports being by himself most of the time and does not have a lot of friends  He prefers solitary activities and prefers to be alone than being around people  He works as    He does not have a girlfriend but he says that he dated someone online  He has been obsessive about the schizoid diagnosis has been reading articles and books about that  In one of the articles he read it is reported that Autism could be the underlying cause of schizoid personality  During the school time he received psychiatric treatment but he did not remember when and where    He reports dropping off school and then getting back his GED later underrating IT school for "and 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 in IT  He denies any history of delusions or hallucinations, denies any current suicidal or homicidal ideations    Current Rating Scores:     None completed today  Psychiatric Review Of Systems:    Appetite: no change  Adverse eating: denies currently  Weight changes: no  Insomnia/sleeplessness: yes  Fatigue/anergy: yes  Anhedonia/lack of interest: no  Attention/concentration: no  Psychomotor agitation/retardation: no  Somatic symptoms: no  Anxiety/panic attack: yes  Linda/hypomania: no  Hopelessness/helplessness/worthlessness: no  Self-injurious behavior/high-risk behavior: no  Suicidal ideation: no  Homicidal ideation: no  Auditory hallucinations: no  Visual hallucinations: no  Other perceptual disturbances: no  Delusional thinking: no  Obsessive/compulsive symptoms: no    Review Of Systems:    Constitutional negative   ENT negative   Cardiovascular negative   Respiratory negative   Gastrointestinal negative   Genitourinary negative   Musculoskeletal negative   Integumentary negative   Neurological negative   Endocrine negative   Other Symptoms none, all other systems are negative       Family Psychiatric History:     Family History   Problem Relation Age of Onset   • Hypertension Father          Past Psychiatric History:     Previous inpatient psychiatric admissions: none  Previous inpatient/outpatient substance abuse rehabilitation: none  Present/previous outpatient psychiatric treatment: during childhood  Present/previous psychotherapy: in school  History of suicidal attempts/gestures: none  History of violence/aggressive behaviors: none  Present/previous psychotropic medication use: Aderral, Prozac  Substance Abuse History:    Patient denies history of alcohol, illict substance, or tobacco abuse  Delonte Lombardi does not apear under the influence or withdrawal of any psychoactive substance throughout today's examination       Social History:    Academic history: high school diploma/GED  Marital history: single  Children: 0  Social support system: lives alone  Residential history: Resides in house; lives alone  Vocational History: self-employed  Access to firearms: denies direct access to weapons/firearms  Legal History: none    Traumatic History:     Abuse:none is reported   Other Traumatic Events: none    Past Medical History:    Past Medical History:   Diagnosis Date   • Abscess of right external ear 9/2/2022   • Anxiety    • Attention deficit disorder (ADD) without hyperactivity    • Catatonic schizophrenia, chronic condition ([facility] [address] ) 9/8/2009   • Major depressive disorder 12/10/2009    Formatting of this note might be different from the original  ICD-10 update of inactive term   • Narcolepsy    • Other personality disorder 12/10/2009    Formatting of this note might be different from the original  Cluster A personality disorder features (schizoid versus schizotypal personality) per neuropsychological testing 12/4/2009  • Persistent hypersomnia of non-organic origin 9/8/2009   • Sleep apnea         No past surgical history on file  No Known Allergies    History Review: The following portions of the patient's history were reviewed and updated as appropriate: allergies, current medications, past family history, past medical history, past social history, past surgical history and problem list     OBJECTIVE:    Vital signs in last 24 hours: There were no vitals filed for this visit      Mental Status Evaluation:    Appearance age appropriate, casually dressed   Behavior cooperative, calm   Speech normal rate, normal volume, normal pitch   Mood normal   Affect normal range and intensity, appropriate   Thought Processes organized, goal directed   Associations intact associations   Thought Content no overt delusions   Perceptual Disturbances: no auditory hallucinations, no visual hallucinations   Abnormal Thoughts  Risk Potential Suicidal ideation - None  Homicidal ideation - None  Potential for aggression - No   Orientation oriented to person, place, time/date and situation   Memory recent and remote memory grossly intact   Consciousness alert and awake   Attention Span Concentration Span attention span and concentration are age appropriate   Intellect appears to be of average intelligence   Insight intact   Judgement intact   Muscle Strength and  Gait normal muscle strength and normal muscle tone, normal gait and normal balance   Motor Activity no abnormal movements   Language no difficulty naming common objects, no difficulty repeating a phrase, no difficulty writing a sentence   Fund of Knowledge adequate knowledge of current events  adequate fund of knowledge regarding past history  adequate fund of knowledge regarding vocabulary    Pain none   Pain Scale 0       Laboratory Results: I have personally reviewed all pertinent laboratory/tests results    Recent Labs (last 12 months):   Lab on 10/21/2022   Component Date Value   • Sodium 10/21/2022 137    • Potassium 10/21/2022 4 0    • Chloride 10/21/2022 103    • CO2 10/21/2022 30    • ANION GAP 10/21/2022 4    • BUN 10/21/2022 16    • Creatinine 10/21/2022 0 98    • Glucose, Fasting 10/21/2022 83    • Calcium 10/21/2022 9 4    • AST 10/21/2022 22    • ALT 10/21/2022 53    • Alkaline Phosphatase 10/21/2022 75    • Total Protein 10/21/2022 7 8    • Albumin 10/21/2022 4 2    • Total Bilirubin 10/21/2022 0 56    • eGFR 10/21/2022 100    • Cholesterol 10/21/2022 151    • Triglycerides 10/21/2022 60    • HDL, Direct 10/21/2022 51    • LDL Calculated 10/21/2022 88    • Non-HDL-Chol (CHOL-HDL) 10/21/2022 100    • Ventricular Rate 10/21/2022 62    • Atrial Rate 10/21/2022 62    • IL Interval 10/21/2022 190    • QRSD Interval 10/21/2022 98    • QT Interval 10/21/2022 442    • QTC Interval 10/21/2022 448    • P Axis 10/21/2022 66    • QRS Axis 10/21/2022 86    • T Wave Axis 10/21/2022 73 Suicide/Homicide Risk Assessment:    Risk of Harm to Self:  The following ratings are based on assessment at the time of the interview  Demographic risk factors include: , , male  Historical Risk Factors include: history of depression, history of anxiety  Recent Specific Risk Factors include: mental illness diagnosis  Protective Factors: no current suicidal ideation, ability to adapt to change, able to manage anger well, access to mental health treatment, compliant with medications, compliant with mental health treatment, connection to community, contact with caregivers, cultural beliefs discouraging suicide, effective coping skills, effective decision-making skills, effective problem solving skills, good health, good self-esteem, having a desire to be alive, having a desire to live, having a sense of purpose or meaning in life, having pets, healthy fear of risky behaviors and pain, impulse control, medical compliance, no substance use problems, opportunities to contribute to community, opportunities to participate in community, personal beliefs, personal beliefs about the meaning and value of life, [employer] beliefs discouraging suicide, resiliency, responsibilities and duties to others, restricted access to lethal means, stable living environment, stable job, sense of determination, sense of importance of health and wellness, sense of personal control, sobriety  Weapons: none  The following steps have been taken to ensure weapons are properly secured: not applicable  Based on today's assessment, Dhiraj Roach presents the following risk of harm to self: low    Risk of Harm to Others: The following ratings are based on assessment at the time of the interview  Demographic Risk Factors include: male, under age 36  Historical Risk Factors include: none  Recent Specific Risk Factors include: none    Protective Factors: no current homicidal ideation, ability to adapt to change, able to manage anger well, access to mental health treatment, connection to community, effective coping skills, effective decision-making skills, effective problem solving skills, good self-esteem  Weapons: none  The following steps have been taken to ensure weapons are properly secured: not applicable  Based on today's assessment, Godfrey George presents the following risk of harm to others: low    The following interventions are recommended: no intervention changes needed  Although patient's acute lethality risk is low, long-term/chronic lethality risk is mildly elevated in the presence of social anxiety  At the current moment, Godfrey George is future-oriented, forward-thinking, and demonstrates ability to act in a self-preserving manner as evidenced by volitionally presenting to the clinic today, seeking treatment  To mitigate future risk, patient should adhere to the recommendations of this writer, avoid alcohol/illicit substance use, utilize community-based resources and familiar support and prioritize mental health treatment  Presently, patient denies suicidal/homicidal ideation in addition to thoughts of self-injury; contracts for safety, see below for risk assessment  At conclusion of evaluation, patient is amenable to the recommendations of this writer including: Referral for neuropsychological assessment  Also, patient is amenable to calling/contacting the outpatient office including this writer if any acute adverse effects of their medication regimen arise in addition to any comments or concerns pertaining to their psychiatric management  Patient is amenable to calling/contacting crisis and/or attending to the nearest emergency department if their clinical condition deteriorates to assure their safety and stability, stating that they are able to appropriately confide in their insight regarding their psychiatric state  Assessment/Plan:     30years old adult male patient was seen today for psychiatric evaluation    He is concerned about having autism since the childhood and does not abuse history with a schizoid personality disorder diagnosis  Patient has no delayed speech and there is no repetitive behaviors and his history  During the visit he was able to maintain good eye contact and understands visual cues  I discussed with patient that I do not see any medical symptoms and signs of autism currently and his social interaction can be explained by his social anxiety versus his personality traits  His primary care already sent a referral for [place] [address] neuropsychology and I sent the same referral today and asked this patient to follow up with them  I asked the patient to schedule follow-up in 3 months discussed the results of the referral    DSM-5 Diagnoses:     1  Social anxiety disorder  Postpone medications and psychotherapy untill assessment is completed    2  Encounter for screening for autism  Postpone medications and psychotherapy untill assessment is completed    3  MD[facility] (major depressive disorder), recurrent[facility]        Treatment Recommendations/Precautions:    • Referral for neuropsychological assessment for testing Autism and personality traits  • Aware of 24 hour and weekend coverage for urgent situations accessed by calling [facility] main practice number    Medications Risks/Benefits:      Risks, Benefits And Possible Side Effects Of Medications:    Risks, benefits, and possible side effects of medications explained to Dontrell Fermin and he verbalizes understanding and agreement for treatment       Controlled Medication Discussion:     Not applicable - controlled prescriptions are not prescribed by this practice    Treatment Plan:    Completed and signed during the session: Yes - with Dontrell Fermin    This note was not shared with the patient due to reasonable likelihood of causing patient harm    Visit Time    Visit Start Time: 9:00 am  Visit Stop Time: 10:00 qm  Total Visit Duration: 60 minutes    Tonio Olivarez, [place] 02/08/23

## 2023-05-05 ENCOUNTER — OFFICE VISIT (OUTPATIENT)
Dept: FAMILY MEDICINE CLINIC | Facility: CLINIC | Age: 38
End: 2023-05-05

## 2023-05-05 VITALS
HEIGHT: 66 IN | BODY MASS INDEX: 22.98 KG/M2 | OXYGEN SATURATION: 98 % | SYSTOLIC BLOOD PRESSURE: 130 MMHG | WEIGHT: 143 LBS | HEART RATE: 87 BPM | DIASTOLIC BLOOD PRESSURE: 74 MMHG

## 2023-05-05 DIAGNOSIS — L30.4 INTERTRIGO: ICD-10-CM

## 2023-05-05 RX ORDER — CLOTRIMAZOLE AND BETAMETHASONE DIPROPIONATE 10; .64 MG/G; MG/G
CREAM TOPICAL 2 TIMES DAILY
Qty: 30 G | Refills: 0 | Status: SHIPPED | OUTPATIENT
Start: 2023-05-05

## 2023-05-05 NOTE — PROGRESS NOTES
Name: Trino Hernandez      : 1985      MRN: [id number]  Encounter Provider: Lucy Cali PA-C  Encounter Date: 2023   [facility] [place] [id number]     1  Intertrigo  -     clotrimazole-betamethasone (LOTRISONE) 1-0 05 % cream; Apply topically 2 (two) times a day  refill Lotrisone  Keep area clean, cool and dry  Follow up prn       Subjective     Hx of intertrigo of bilat axillae improved with lotrisone previously  Is reccurring  No other concerns  Rash  This is a recurrent problem  The current episode started 1 to 4 weeks ago  The problem has been waxing and waning since onset  The affected locations include the left axilla and right axilla  The rash is characterized by itchiness  He was exposed to nothing  Pertinent negatives include no anorexia, congestion, cough, diarrhea, facial edema, fatigue, fever, joint pain, rhinorrhea, shortness of breath, sore throat or vomiting  Review of Systems   Constitutional: Negative for fatigue and fever  HENT: Negative for congestion, rhinorrhea and sore throat  Eyes: Negative for pain  Respiratory: Negative for cough and shortness of breath  Gastrointestinal: Negative for anorexia, diarrhea and vomiting  Musculoskeletal: Negative for joint pain  Skin: Positive for rash  All other systems reviewed and are negative        Past Medical History:   Diagnosis Date    Abscess of right external ear 2022    Anxiety     Attention deficit disorder (ADD) without hyperactivity     Catatonic schizophrenia, chronic condition ([facility] [address] ) 2009    Major depressive disorder 12/10/2009    Formatting of this note might be different from the original  ICD-10 update of inactive term    Narcolepsy     Other personality disorder 12/10/2009    Formatting of this note might be different from the original  Cluster A personality disorder features (schizoid versus schizotypal personality) per "neuropsychological testing 12/4/2009   Persistent hypersomnia of non-organic origin 9/8/2009    Sleep apnea      History reviewed  No pertinent surgical history  Family History   Problem Relation Age of Onset    Hypertension Father      Social History     Socioeconomic History    Marital status: Single     Spouse name: None    Number of children: None    Years of education: None    Highest education level: None   Occupational History    None   Tobacco Use    Smoking status: Never    Smokeless tobacco: Never   Vaping Use    Vaping Use: Never used   Substance and Sexual Activity    Alcohol use: Never    Drug use: Never    Sexual activity: Never   Other Topics Concern    None   Social History Narrative    None     Social Determinants of Health     Financial Resource Strain: Not on file   Food Insecurity: Not on file   Transportation Needs: Not on file   Physical Activity: Not on file   Stress: Not on file   Social Connections: Not on file   Intimate Partner Violence: Not on file   Housing Stability: Not on file     Current Outpatient Medications on File Prior to Visit   Medication Sig    Zinc-Magnesium Aspart-Vit B6 (ZINC MAGNESIUM ASPARTATE PO) Take by mouth (Patient not taking: Reported on 5/5/2023)    [DISCONTINUED] clotrimazole-betamethasone (LOTRISONE) 1-0 05 % cream Apply topically 2 (two) times a day (Patient not taking: Reported on 5/5/2023)     No Known Allergies  Immunization History   Administered Date(s) Administered    COVID-19 MODERNA VACC 0 5 ML IM 04/05/2021, 05/03/2021    Pneumococcal Polysaccharide PPV23 02/17/1995    Tdap 10/07/2022       Objective     /74   Pulse 87   Ht 5' 6\" (1 676 m)   Wt 64 9 kg (143 lb)   SpO2 98%   BMI 23 08 kg/m²     Physical Exam  Vitals and nursing note reviewed  Constitutional:       Appearance: Normal appearance  HENT:      Head: Normocephalic and atraumatic        Nose: Nose normal    Cardiovascular:      Rate and Rhythm: Regular " rhythm  Heart sounds: Normal heart sounds  No murmur heard  Pulmonary:      Effort: Pulmonary effort is normal  No respiratory distress  Breath sounds: Normal breath sounds  No wheezing or rhonchi  Musculoskeletal:      Cervical back: Normal range of motion  Skin:     General: Skin is warm and dry  Findings: Rash (recurrence of fungal appearing rash of both axillae, mild) present  Neurological:      Mental Status: He is alert and oriented to person, place, and time         Geeta Powers PA-C

## 2024-03-14 ENCOUNTER — APPOINTMENT (EMERGENCY)
Dept: CT IMAGING | Facility: HOSPITAL | Age: 39
End: 2024-03-14
Payer: COMMERCIAL

## 2024-03-14 ENCOUNTER — HOSPITAL ENCOUNTER (EMERGENCY)
Facility: HOSPITAL | Age: 39
Discharge: HOME | End: 2024-03-14
Attending: EMERGENCY MEDICINE
Payer: COMMERCIAL

## 2024-03-14 VITALS
DIASTOLIC BLOOD PRESSURE: 76 MMHG | SYSTOLIC BLOOD PRESSURE: 120 MMHG | HEART RATE: 56 BPM | TEMPERATURE: 97.5 F | OXYGEN SATURATION: 96 % | RESPIRATION RATE: 14 BRPM

## 2024-03-14 DIAGNOSIS — R55 SYNCOPE: Primary | ICD-10-CM

## 2024-03-14 LAB
ALBUMIN SERPL BCP-MCNC: 4.3 G/DL (ref 3.5–5)
ALP SERPL-CCNC: 55 U/L (ref 34–104)
ALT SERPL W P-5'-P-CCNC: 19 U/L (ref 7–52)
ANION GAP SERPL CALCULATED.3IONS-SCNC: 7 MMOL/L (ref 4–13)
AST SERPL W P-5'-P-CCNC: 17 U/L (ref 13–39)
BASOPHILS # BLD AUTO: 0.03 THOUSANDS/ÂΜL (ref 0–0.1)
BASOPHILS NFR BLD AUTO: 0 % (ref 0–1)
BILIRUB SERPL-MCNC: 0.49 MG/DL (ref 0.2–1)
BUN SERPL-MCNC: 22 MG/DL (ref 5–25)
CALCIUM SERPL-MCNC: 9 MG/DL (ref 8.4–10.2)
CARDIAC TROPONIN I PNL SERPL HS: <2 NG/L
CARDIAC TROPONIN I PNL SERPL HS: <2 NG/L
CHLORIDE SERPL-SCNC: 105 MMOL/L (ref 96–108)
CO2 SERPL-SCNC: 28 MMOL/L (ref 21–32)
CREAT SERPL-MCNC: 0.88 MG/DL (ref 0.6–1.3)
EOSINOPHIL # BLD AUTO: 0.07 THOUSAND/ÂΜL (ref 0–0.61)
EOSINOPHIL NFR BLD AUTO: 1 % (ref 0–6)
ERYTHROCYTE [DISTWIDTH] IN BLOOD BY AUTOMATED COUNT: 11.9 % (ref 11.6–15.1)
GFR SERPL CREATININE-BSD FRML MDRD: 110 ML/MIN/1.73SQ M
GLUCOSE SERPL-MCNC: 93 MG/DL (ref 65–140)
HCT VFR BLD AUTO: 43.3 % (ref 36.5–49.3)
HGB BLD-MCNC: 15.2 G/DL (ref 12–17)
IMM GRANULOCYTES # BLD AUTO: 0.04 THOUSAND/UL (ref 0–0.2)
IMM GRANULOCYTES NFR BLD AUTO: 0 % (ref 0–2)
LYMPHOCYTES # BLD AUTO: 1.69 THOUSANDS/ÂΜL (ref 0.6–4.47)
LYMPHOCYTES NFR BLD AUTO: 11 % (ref 14–44)
MCH RBC QN AUTO: 30.6 PG (ref 26.8–34.3)
MCHC RBC AUTO-ENTMCNC: 35.1 G/DL (ref 31.4–37.4)
MCV RBC AUTO: 87 FL (ref 82–98)
MONOCYTES # BLD AUTO: 1.12 THOUSAND/ÂΜL (ref 0.17–1.22)
MONOCYTES NFR BLD AUTO: 7 % (ref 4–12)
NEUTROPHILS # BLD AUTO: 12.26 THOUSANDS/ÂΜL (ref 1.85–7.62)
NEUTS SEG NFR BLD AUTO: 81 % (ref 43–75)
NRBC BLD AUTO-RTO: 0 /100 WBCS
PLATELET # BLD AUTO: 181 THOUSANDS/UL (ref 149–390)
PMV BLD AUTO: 11.6 FL (ref 8.9–12.7)
POTASSIUM SERPL-SCNC: 3.9 MMOL/L (ref 3.5–5.3)
PROT SERPL-MCNC: 7.1 G/DL (ref 6.4–8.4)
RBC # BLD AUTO: 4.96 MILLION/UL (ref 3.88–5.62)
SODIUM SERPL-SCNC: 140 MMOL/L (ref 135–147)
WBC # BLD AUTO: 15.21 THOUSAND/UL (ref 4.31–10.16)

## 2024-03-14 PROCEDURE — 85025 COMPLETE CBC W/AUTO DIFF WBC: CPT

## 2024-03-14 PROCEDURE — 80053 COMPREHEN METABOLIC PANEL: CPT

## 2024-03-14 PROCEDURE — 74177 CT ABD & PELVIS W/CONTRAST: CPT

## 2024-03-14 PROCEDURE — 93005 ELECTROCARDIOGRAM TRACING: CPT

## 2024-03-14 PROCEDURE — 99284 EMERGENCY DEPT VISIT MOD MDM: CPT

## 2024-03-14 PROCEDURE — 99285 EMERGENCY DEPT VISIT HI MDM: CPT

## 2024-03-14 PROCEDURE — 36415 COLL VENOUS BLD VENIPUNCTURE: CPT

## 2024-03-14 PROCEDURE — 84484 ASSAY OF TROPONIN QUANT: CPT

## 2024-03-14 RX ORDER — ONDANSETRON 2 MG/ML
1 INJECTION INTRAMUSCULAR; INTRAVENOUS ONCE
Status: COMPLETED | OUTPATIENT
Start: 2024-03-14 | End: 2024-03-14

## 2024-03-14 RX ADMIN — IOHEXOL 100 ML: 350 INJECTION, SOLUTION INTRAVENOUS at 20:43

## 2024-03-15 LAB
ATRIAL RATE: 76 BPM
P AXIS: 55 DEGREES
PR INTERVAL: 190 MS
QRS AXIS: 50 DEGREES
QRSD INTERVAL: 94 MS
QT INTERVAL: 414 MS
QTC INTERVAL: 465 MS
T WAVE AXIS: 53 DEGREES
VENTRICULAR RATE: 76 BPM

## 2024-03-15 PROCEDURE — 93010 ELECTROCARDIOGRAM REPORT: CPT | Performed by: INTERNAL MEDICINE
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
## 2024-03-15 NOTE — DISCHARGE INSTRUCTIONS
Follow up with PCP  Supportive care  Return to the ED with new or worsening symptoms including but not limited to weakness, dizziness, abdominal pain
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
## 2024-03-19 ENCOUNTER — OFFICE VISIT (OUTPATIENT)
Dept: FAMILY MEDICINE CLINIC | Facility: CLINIC | Age: 39
End: 2024-03-19
Payer: COMMERCIAL

## 2024-03-19 VITALS
OXYGEN SATURATION: 98 % | BODY MASS INDEX: 22.82 KG/M2 | WEIGHT: 142 LBS | DIASTOLIC BLOOD PRESSURE: 84 MMHG | HEART RATE: 79 BPM | TEMPERATURE: 97.7 F | SYSTOLIC BLOOD PRESSURE: 137 MMHG | HEIGHT: 66 IN

## 2024-03-19 DIAGNOSIS — R55 SYNCOPE, UNSPECIFIED SYNCOPE TYPE: Primary | ICD-10-CM

## 2024-03-19 PROBLEM — F80.82 SOCIAL PRAGMATIC COMMUNICATION DISORDER: Status: ACTIVE | Noted: 2024-03-19

## 2024-03-19 PROCEDURE — 99214 OFFICE O/P EST MOD 30 MIN: CPT | Performed by: FAMILY MEDICINE

## 2025-03-07 ENCOUNTER — OFFICE VISIT (OUTPATIENT)
Dept: FAMILY MEDICINE CLINIC | Facility: CLINIC | Age: 40
End: 2025-03-07
Payer: COMMERCIAL

## 2025-03-07 VITALS
TEMPERATURE: 97 F | SYSTOLIC BLOOD PRESSURE: 124 MMHG | OXYGEN SATURATION: 97 % | WEIGHT: 144.6 LBS | DIASTOLIC BLOOD PRESSURE: 88 MMHG | BODY MASS INDEX: 23.24 KG/M2 | HEART RATE: 84 BPM | HEIGHT: 66 IN

## 2025-03-07 DIAGNOSIS — Z00.00 ANNUAL PHYSICAL EXAM: Primary | ICD-10-CM

## 2025-03-07 DIAGNOSIS — L30.4 INTERTRIGO: ICD-10-CM

## 2025-03-07 PROCEDURE — 99213 OFFICE O/P EST LOW 20 MIN: CPT

## 2025-03-07 PROCEDURE — 99395 PREV VISIT EST AGE 18-39: CPT

## 2025-03-07 RX ORDER — CLOTRIMAZOLE AND BETAMETHASONE DIPROPIONATE 10; .64 MG/G; MG/G
CREAM TOPICAL 2 TIMES DAILY
Qty: 45 G | Refills: 0 | Status: SHIPPED | OUTPATIENT
Start: 2025-03-07

## 2025-03-07 NOTE — PROGRESS NOTES
"Adult Annual Physical  Name: Mynor Paredes      : 1985      MRN: [id number]  Encounter Provider: Abel Chavarria PA-C  Encounter Date: 3/7/2025   Encounter department: [facility]    Assessment & Plan  Annual physical exam         Intertrigo  Pt has had rash in his axillary area first around   Has happened under both sides, waxing and waning  Has responded to Lotrisone in the past   No other symptoms denies cough fever or symptoms of illness chest pain shortness of breath  Exam reveals a small 2 cm well-circumscribed area of erythema and skin irritation under the left axilla  Given response to topical Lotrisone therapy in the past, will refill benefits and risk of medication discussed, advised keeping area cool and dry  Follow-up as needed  Orders:    clotrimazole-betamethasone (LOTRISONE) 1-0.05 % cream; Apply topically 2 (two) times a day    Immunizations and preventive care screenings were discussed with patient today. Appropriate education was printed on patient's after visit summary.    Counseling:  Dental Health: discussed importance of regular tooth brushing, flossing, and dental visits.  Exercise: the importance of regular exercise/physical activity was discussed. Recommend exercise 3-5 times per week for at least 30 minutes.          History of Present Illness     Adult Annual Physical:  Patient presents for annual physical. Mynor Paredes is a 39 y.o. male  presenting for recurrence of skin rash and is also due for annual    vaccines, care gaps, routine labs, relevant lab work and imaging, reviewed at this visit.  He does not desire routine blood work.  He denies routine vaccination.    **Note: Portions of the record may have been created with voice recognition software.  Occasional wrong word or \"sound alike\" substitutions may have occurred due to the inherent limitations of voice recognition software.  Please read the chart carefully and recognize, using " context, where substitutions have occurred. Please contact for further clarification, when necessary.   .     Diet and Physical Activity:  - Diet/Nutrition: well balanced diet.  - Exercise: strength training exercises and moderate cardiovascular exercise.    Depression Screening:  - PHQ-2 Score: 0    General Health:  - Sleep: sleeps well.  - Hearing: normal hearing right ear and normal hearing left ear.  - Vision: no vision problems and wears glasses.  - Dental: regular dental visits.    Review of Systems   Constitutional:  Negative for chills and fever.   HENT:  Negative for ear pain and sore throat.    Eyes:  Negative for pain and visual disturbance.   Respiratory:  Negative for cough and shortness of breath.    Cardiovascular:  Negative for chest pain and palpitations.   Gastrointestinal:  Negative for abdominal pain and vomiting.   Genitourinary:  Negative for dysuria and hematuria.   Musculoskeletal:  Negative for arthralgias and back pain.   Skin:  Positive for rash. Negative for color change.   Neurological:  Negative for seizures and syncope.   All other systems reviewed and are negative.    Medical History Reviewed by provider this encounter:  Tobacco  Allergies  Meds  Problems  Med Hx  Surg Hx  Fam Hx     .  Past Medical History   Past Medical History:   Diagnosis Date    Abscess of right external ear 9/2/2022    Anxiety     Attention deficit disorder (ADD) without hyperactivity     Catatonic schizophrenia, chronic condition (HCC) 9/8/2009    Major depressive disorder 12/10/2009    Formatting of this note might be different from the original. ICD-10 update of inactive term    Narcolepsy     Other personality disorder 12/10/2009    Formatting of this note might be different from the original. Cluster A personality disorder features (schizoid versus schizotypal personality) per neuropsychological testing 12/4/2009.    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 No pertinent surgical history.  Family History   Problem Relation Age of Onset    Hypertension Father       reports that he has never smoked. He has never used smokeless tobacco. He reports that he does not drink alcohol and does not use drugs.  Current Outpatient Medications   Medication Instructions    clotrimazole-betamethasone (LOTRISONE) 1-0.05 % cream Topical, 2 times daily   No Known Allergies   Current Outpatient Medications on File Prior to Visit   Medication Sig Dispense Refill    [DISCONTINUED] clotrimazole-betamethasone (LOTRISONE) 1-0.05 % cream Apply topically 2 (two) times a day 30 g 0     No current facility-administered medications on file prior to visit.      Social History     Tobacco Use    Smoking status: Never    Smokeless tobacco: Never   Vaping Use    Vaping status: Never Used   Substance and Sexual Activity    Alcohol use: Never    Drug use: Never    Sexual activity: Never       Objective   /88   Pulse 84   Temp (!) 97 °F (36.1 °C)   Ht 5' 6\" (1.676 m)   Wt 65.6 kg (144 lb 9.6 oz)   SpO2 97%   BMI 23.34 kg/m²     Physical Exam  Vitals and nursing note reviewed.   Constitutional:       General: He is not in acute distress.     Appearance: He is well-developed.   HENT:      Head: Normocephalic and atraumatic.   Eyes:      Conjunctiva/sclera: Conjunctivae normal.   Cardiovascular:      Rate and Rhythm: Normal rate and regular rhythm.      Heart sounds: No murmur heard.  Pulmonary:      Effort: Pulmonary effort is normal. No respiratory distress.      Breath sounds: Normal breath sounds.   Abdominal:      Palpations: Abdomen is soft.      Tenderness: There is no abdominal tenderness.   Musculoskeletal:         General: No swelling.      Cervical back: Neck supple.   Skin:     General: Skin is warm and dry.      Capillary Refill: Capillary refill takes less than 2 seconds.      Findings: Erythema and rash (Small area of well-circumscribed erythema in the left axilla not " draining) present.   Neurological:      Mental Status: He is alert.   Psychiatric:         Mood and Affect: Mood normal.